# Patient Record
(demographics unavailable — no encounter records)

---

## 2018-01-11 NOTE — PROGRESS NOTES
Chief Complaint  Chief Complaint Free Text Note Form: Campbell Diggs attended his month 1 1150 [facility] appointment today  His current weight is 292 0 which is a weight gain of  5#  Campbell Diggs admits that stress has a played a huge role in his weight gain thus far  He identified his previous job as a main trigger due to the long commute and long work hours  His current barriers to losing weight are not being able to cook for himself, getting bored of the meal replacement options, and sleeping too much  He recognizes that the weekends are his hardest part of the week which frequently result in ordering out and remaining stationary on the couch  One goal he would like to add is biking on the weekends  He appeared unaware of emotional triggers to eating  Certain questions for him to remain mindful of emotional eating were discussed and written down for him: Am I hungry right now? How am I feeling right now? What triggers my depression? What triggers my anxiety? He had little insight into possible triggers for these symptoms  He was encouraged to turn the TV off when he eats, to journal his thoughts and emotions while eating, and to talk to his support system about how he is feeling to avoid "stuffing" his emotions  Progress with these behavioral goals will be monitored in the next session  Active Problems    1  Depression with anxiety (300 4) (F41 8)   2  Digestive problems (V47 3) (K92 9)   3  Heart burn (787 1) (R12)   4  Hypertension (401 9) (I10)   5  Morbid obesity due to excess calories (278 01) (E66 01)    Past Medical History    1  No pertinent past medical history    Surgical History    1  Denied: History of Recent Surgery    Family History  Mother    1  No pertinent family history  Father    2  No pertinent family history  Family History    3  No pertinent family history    Social History    · Never a smoker   · No drug use   · Recovering alcoholic (220 72) (D15 35)   · Social drinker    Current Meds   1   BuPROPion HCl ER (SR) 150 MG Oral Tablet Extended Release 12 Hour; Therapy: (Recorded:52Izp4259) to Recorded   2  Sertraline HCl - 100 MG Oral Tablet; Therapy: (Recorded:50Fiz8566) to Recorded   3  Topiramate 25 MG Oral Tablet; Therapy: (Recorded:49Bmz3756) to Recorded    Allergies    1  Augmentin   2  Ceclor    Vitals  Signs   Recorded: 08BPD2786 10:34AM   Height: 5 ft 8 in  Weight: 292 lb   BMI Calculated: 44 4  BSA Calculated: 2 4    Future Appointments    Date/Time Provider Specialty Site   10/25/2016 08:00 AM Karl Sharp  [place] [id number] [facility]   11/25/2016 08:15 AM JAY Mcintosh   [facility]     Signatures   Electronically signed by : Nicolas Dougherty, ; Sep 27 2016 10:10AM EST                       (Author)    Electronically signed by : JAY Narayan ; Sep 27 2016 11:32AM EST                       (Co-author)

## 2018-01-11 NOTE — PROGRESS NOTES
History of Present Illness  HPI: Adilia Chino is here for f/u  Current wt 292 lbs, loss of 3 lbs x 3 1/2 wks  Tracking inconsistently and consuming ~1700 calories/day  Reviewed diana records and has not tracked since 9/14/16  Encouraged him to start tracking  Making much better choices with meals then he was prior however admits that he gets home from work and doesn't feel like making anything or falls asleep  Encouraged him to use a meal replacement at this time right when he gets home  He has cut back on skim milk from ~gallon/day to 2 glasses (~24 oz)  Struggles on w/e with eating out and sports events  Encouraged to plan accordingly with a meal replacement in the a m  and incorporating physical activity to offset some of those extra calories  Bariatric MNT [facility] [place]:   Weight Assessment: IBW: 154, ABW: 188 5 lbs, Weight Change: 3 lbs x 3 1/2 wks, Goal Weight: ,   Weight loss attempts: Weight Watchers: 50 lbs  Excess calories come from in between meal snacking, large portions at mealtimes and high calorie high fat food choices  Dietary Recall:   Breakfast: Belvita or meal replacement  Snack: belvita or bar  Lunch: chicken, yogurt, 100 ga pretzels, jello  Snack: nutragrain bar  Dinner: skip  Snack: fruit snack or jello   Beverages: water, skim milk, diet iced tea  Estimated fluid intake: 72 oz  Alcohol consumption: beer on w/e  Food allergies/intolerances: n/a  Cooking: self  Shopping: self  He dines out 2-3 times per week  Exercise Frequency:  He does not exercise  His obesity/being overweight is related to excess energy intake and as evidenced by BMI=44 5  Nutrition Prescription: Estimated calories for weight loss: Tanita BMR 2410x1  3-3565=1085, eCal BMR 2259, wt loss w/o exercise 6061-2417, Estimated daily protein needs:  gm (1 2-1 5 gm/kg IBW) and Estimated daily fluid needs: 82 oz (35 cc/kg IBW)  Breakfast: belvita or meal replacement + fruit     Snack: as above  Lunch: as above  Snack: as above  Dinner: meal replacement  Snack: as above   Nutrition Intervention: Counseling provided with emphasis on meal planning, exercise and food journaling  Education materials provided: New Direction manual    Comprehension: good  Expected Compliance: good  Goals: follow meal plan prescribed, plan meals and snacks daily, Choose lower-calorie, lower-fat meal options at home and when dining out, food journal, do not skip meals or snacks and 3142-0180 calories  Monitor/Evaluation: weekly weight, food journal, fluid intake and exercise level  Active Problems    1  Depression with anxiety (300 4) (F41 8)   2  Digestive problems (V47 3) (K92 9)   3  Heart burn (787 1) (R12)   4  Hypertension (401 9) (I10)   5  Morbid obesity due to excess calories (278 01) (E66 01)    Past Medical History    1  No pertinent past medical history    Surgical History    1  Denied: History of Recent Surgery    Family History  Mother    1  No pertinent family history  Father    2  No pertinent family history  Family History    3  No pertinent family history    Social History    · Never a smoker   · No drug use   · Recovering alcoholic (264 35) (T84 47)   · Social drinker    Current Meds   1  BuPROPion HCl ER (SR) 150 MG Oral Tablet Extended Release 12 Hour; Therapy: (Recorded:78Koi4871) to Recorded   2  Sertraline HCl - 100 MG Oral Tablet; Therapy: (Recorded:59Rga1662) to Recorded   3  Topiramate 25 MG Oral Tablet; Therapy: (Recorded:47Sku7211) to Recorded    Allergies    1  Augmentin   2  Ceclor    Future Appointments    Date/Time Provider Specialty Site   10/25/2016 08:00 AM Juliet Castro  [facility]   11/25/2016 08:15 AM JAY Jamil  Bariatric Medicine [facility]     Signatures   Electronically signed by :  Rafita Mcnally, ; Sep 27 2016  9:32AM EST                       (Author)    Electronically signed by : JAY Garrett ; Sep 27 2016 11:30AM EST                       (Co-author)

## 2018-01-11 NOTE — PROGRESS NOTES
History of Present Illness  HPI: [facility] [employer]  He stated he has experienced a gradual yearly weight gain  Per dietary recall patient consumes excess calories from large portions of high calorie high fat food choices while dining out at mealtimes  He consumes up to a gallon of skim milk daily  He stated that weekends are more difficult to manage calorie level and consume more take out food  Bariatric MNT [facility]:   Weight Assessment: IBW: 154#, ABW: 189#, Goal Weight:     Excess calories come from in between meal snacking, large portions at mealtimes and high calorie high fat food choices  Dietary Recall:   Breakfast: Large of bowl of cereal - Corbin PB Puffs  Skim milk- unmeasured   Banana     Snack: skip    Lunch: Dine out - [employer]/ [employer] or foot long with shake  Snack: skip   snack at work on donuts/ bagel  Dinner: Skip  or Take out - sandwich or wings  Snack: yogurt or fruit snack      Beverages: water / milk gallon of milk in a day / ice tea - diet   Estimated fluid intake: <64oz  Alcohol consumption: beer on weekend  Food allergies/intolerances: none  He dines out more than 5 times per week  Exercise Frequency:  He exercises none - used to lift weights  His obesity/being overweight is related to excessive energy intake  Nutrition Prescription: Estimated calories for weight loss: Tanita BMR 2410 x 1 3-1000 = 2133 Ecal BMR: 2273 weight loss range: 0666-7160 calories, Estimated daily protein needs: 84-105gm ( 1 2-1 5 gm/kg IBW) and Estimated daily fluid needs: 82oz ( 35ml/kg IBW)  Nutrition Intervention: Counseling provided with emphasis on meal planning, portion sizes, healthy snack choices, hydration, fiber intake, protein intake, exercise and food journaling     Education materials provided: New Direction manual, calorie controlled menus, low carbohydrate meal planning, low carb, high protein snack choices, high fiber, label reading tips, lean protein food choices and food journal tips  Comprehension: good  Expected Compliance: good  Goals: follow meal plan prescribed, reduce portion sizes at mealtimes, plan meals and snacks daily, Choose lower-calorie, lower-fat meal options at home and when dining out, food journal, do not skip meals or snacks, increase protein intake  grams daily, increase fluid intake 80-90oz fluid daily and 6671-1574 calories using 2 meal replacements and 1 bar  Monitor/Evaluation: weekly weight, food journal, fluid intake and exercise level  Active Problems    1  Depression with anxiety (300 4) (F41 8)   2  Digestive problems (V47 3) (K92 9)   3  Heart burn (787 1) (R12)   4  Hypertension (401 9) (I10)   5  Morbid obesity due to excess calories (278 01) (E66 01)    Surgical History    1  Denied: History of Recent Surgery    Family History  Mother    1  No pertinent family history  Father    2  No pertinent family history  Family History    3  No pertinent family history    Social History    · Never a smoker   · No drug use   · Recovering alcoholic (112 70) (E14 32)   · Social drinker    Current Meds   1  BuPROPion HCl ER (SR) 150 MG Oral Tablet Extended Release 12 Hour; Therapy: (Recorded:16Fgd1272) to Recorded   2  Sertraline HCl - 100 MG Oral Tablet; Therapy: (Recorded:75Rmv8004) to Recorded   3  Topiramate 25 MG Oral Tablet; Therapy: (Recorded:00Uga6324) to Recorded    Allergies    1  Augmentin   2   Ceclor    Vitals  Signs   Recorded: 01Sep2016 11:06AM   Height: 5 ft 8 in  Weight: 295 lb   BMI Calculated: 44 85  BSA Calculated: 2 41    Results/Data  Tanita Flowsheet [id number] 11:43AM Christie      Test Name Result Flag Reference   Height 68     Weight 295     Body Fat % 42 8     Fat Mass 126 2     FFM ( Fat Free Mass) 168 8     Muscle Mass 160 6     TBW ( Total Body Water) 128 4     TBW % 43 5     Bone Mass 8 2     BMR ( Basal Metabolic Rate) 0962 Metabolic Age 39     Visceral Fat Rating 24     BMI 44 9         Future Appointments    Date/Time Provider Specialty Site   09/16/2016 08:30 AM Pantera De La Torre  [facility]   11/25/2016 08:15 AM JAY Figueroa   Bariatric Medicine [facility]     Signatures   Electronically signed by : Shayan Alexander, ; Sep  1 2016 11:18AM EST                       (Author)    Electronically signed by : JAY Michael ; Sep  6 2016  9:09AM EST                       (Co-author)

## 2018-01-12 NOTE — MISCELLANEOUS
Provider Comments  Provider Comments:   Dear Salvatore Salcido     We called you about your scheduled appointment for 11/25/2016 today but were unable to reach you  It is very important that you follow up with us so that we can assess your physical and nutritional safety  Please call our office at [phone] to reschedule your appointment       Sincerely,     Jhonatan [facility]            Signatures   Electronically signed by : Nnamdi Gates, ; Nov 25 2016  8:48AM EST                       (Author)

## 2018-01-16 NOTE — RESULT NOTES
Discussion/Summary  Cholesterol mildly elevated T cholesterol at 200, triglycerides elevated  Cr/eGFR-WNL  A1c normal, instrument level elevated-? Was patient fasting   In any case indication to starting mu direction program      Signatures   Electronically signed by : JAY Guidry ; Aug 25 2016 12:13PM EST                       (Author)

## 2018-01-16 NOTE — MISCELLANEOUS
Message  Return to work or school:   Richy Morgan is under my professional care   He was seen in my office on 09/01/2016             Signatures   Electronically signed by : Candida Baires, ; Sep  1 2016 11:01AM EST                       (Author)

## 2018-01-18 NOTE — CONSULTS
Chief Complaint  Chief Complaint Free Text Note Form: New pt, here for his MWM consult  Pt, offered no questions or complaints  Stop bang 4/8      History of Present Illness  Free Text HPI: Obesity-  Severity: Severe  Onset: Since teens  Modifiers: Tried Weight Watchers in 10th grade and lost 50 pounds and maintained this  Gained weight in college and thereafter  Attributes this to being more independent and eating out more not relying on parents food  He also admits to being less active  Associated Symptoms: Get short of breath, wants to be healthier feels sluggish  Past Medical History  Active Problems And Past Medical History Reviewed: The active problems and past medical history were reviewed and updated today  Assessment    1  Morbid obesity due to excess calories (278 01) (E66 01)   2  Hypertension (401 9) (I10)   3  Depression with anxiety (300 4) (F41 8)    Plan   1  (1) COMPREHENSIVE METABOLIC PANEL; Status:Active; Requested for:87Jix3326;    2  (1) HEMOGLOBIN A1C; Status:Active; Requested for:60Fds6538;    3  (1) INSULIN, FASTING; Status:Active; Requested for:82Nkg0923;    4  (1) LIPID PANEL, FASTING; Status:Active; Requested for:44Tgv0795;    5  (1) TSH WITH FT4 REFLEX; Status:Active; Requested for:23Pqa4903;     Discussion/Summary  Discussion Summary:   26 yo male w/ morbid obesity here to pursue medical weight management to improve weight and health  Obesity class 3:  -discussed options of conservative vs BRANDON program +/- meal replacement, not interested in bariatric surgery  -initial focus of 5-10% weight loss over 3-6 mos for improved health  -screening labs    Depression/anxiety:  -On bupropion, Zoloft and topiramate    History of hypertension:  -Has not been on medications for the past 6 months and is only mildly elevated today   -Continue to monitor    +STOPBANG:  -Discussed risks of untreated sleep apnea   Patient prefers to defer sleep medicine evaluation at this time     Instructed patient to establish care with a PCP in the area  Return to clinic for new start visit if labs are okay  12 week follow-up     Review of Systems  Focused-Male:   Constitutional: no fever  ENT: no sore throat  Cardiovascular: no chest pain and no palpitations  Respiratory: shortness of breath  Gastrointestinal: no abdominal pain  Genitourinary: no dysuria  Musculoskeletal: arthralgias  Integumentary: no rashes  Neurological: no headache  Other Symptoms: Psych:+ Depression/anxiety  Active Problems    1  Digestive problems (V47 3) (K92 9)   2  Heart burn (787 1) (R12)   3  Hypertension (401 9) (I10)    Surgical History  Surgical History Reviewed: The surgical history was reviewed and updated today  Family History  Family History    1  No pertinent family history  Family History Reviewed: The family history was reviewed and updated today  Social History    · Never a smoker   · No drug use   · Recovering alcoholic (138 01) (R93 29)   · Social drinker  Social History Reviewed: The social history was reviewed and updated today  Current Meds   1  BuPROPion HCl ER (SR) 150 MG Oral Tablet Extended Release 12 Hour; Therapy: (Recorded:02Izj1144) to Recorded   2  Sertraline HCl - 100 MG Oral Tablet; Therapy: (Recorded:79Xcw4892) to Recorded   3  Topiramate 25 MG Oral Tablet; Therapy: (Recorded:52Ozp7372) to Recorded  Medication List Reviewed: The medication list was reviewed and updated today  Allergies    1  Augmentin   2  Ceclor    Vitals  Vital Signs    Recorded: 53NFE2624 96:11FY   Systolic 789    Diastolic 80    Heart Rate 90    Respiration 18    Temperature 97 8 F    O2 Saturation 98    Height 5 ft 8 in    Weight 292 lb     BMI Calculated 44 4    BSA Calculated 2 41    Waist Circumference  53   Neck Circumference  17     Physical Exam    Constitutional   General appearance: Abnormal   well developed and morbidly obese     Eyes No conjunctival pallor  Ears, Nose, Mouth, and Throat Moist oral mucosa  Pulmonary   Respiratory effort: No increased work of breathing or signs of respiratory distress  Auscultation of lungs: Clear to auscultation, equal breath sounds bilaterally, no wheezes, no rales, no rhonci  Cardiovascular   Auscultation of heart: Normal rate and rhythm, normal S1 and S2, without murmurs  Abdomen   Abdomen: Abnormal   The abdomen was obese  The abdomen was soft and nontender     Musculoskeletal   Gait and station: Normal     Psychiatric   Orientation to person, place and time: Normal     Mood and affect: Normal          Results/Data  STOP BANG Questionnaire 58Mci6411 10:52AM User, Ahs     Test Name Result Flag Reference   STOP BANG Questionnaire Risk of BHAVIN High Risk     Snoring: Yes  Tired: Yes  Observed: No  Blood Pressure: No  BMI: Yes  Age: No  Neck Circumference: Yes  Gender: Yes   STOP BANG Questionnaire BHAVIN Total Score 5     Snoring: Yes  Tired: Yes  Observed: No  Blood Pressure: No  BMI: Yes  Age: No  Neck Circumference: Yes  Gender: Yes       Signatures   Electronically signed by : JAY Fernández ; Aug  5 2016 11:41AM EST                       (Author)